# Patient Record
(demographics unavailable — no encounter records)

---

## 2024-12-19 NOTE — HISTORY OF PRESENT ILLNESS
[de-identified] : 12/19/2024 Mr. BRAULIO SIERRA, a 30 year old male, presents today for R ankle pain since 12/18/24. States he was playing volleyball last night and came down on an opposing athlete's ankle and sustained an inversion sprain. Reports he felt a pop, and edema settled in quickly. Using RICE, with some relief today and decreased edema. Denies PMH of injury to think ankle.

## 2024-12-19 NOTE — PHYSICAL EXAM
[Right] : right foot and ankle [Moderate] : moderate swelling of lateral ankle [4___] : eversion 4[unfilled]/5 [5th] : 5th [] : no metatarsal tenderness [de-identified] : Pain with weightbearing alone on the right foot [[id number]] : dorsiflexion 10 degrees [de-identified] : plantar flexion 20 degrees [de-identified] : inversion 5 degrees [de-identified] : eversion 5 degrees

## 2024-12-19 NOTE — ASSESSMENT
[FreeTextEntry1] : - Discussed the role of home range of motion and NSAIDs -Prescription for physical therapy provided -will get him lace up support for ambulation. Brace was appropriately fitted and dispensed. Pt left the office with brace as stated above.  - f/u 3 weeks

## 2025-01-09 NOTE — PHYSICAL EXAM
[Right] : right foot and ankle [5th] : 5th [Moderate] : moderate swelling of lateral ankle [5___] : eversion 5[unfilled]/5 [] : no difficulty with single heel rise

## 2025-01-09 NOTE — DISCUSSION/SUMMARY
[de-identified] : 30m with right ankle sprain. Improving with PT The patient was advised of the diagnosis. The natural history of the pathology was explained in full to the patient in layman's terms. All questions were answered. The risks and benefits of surgical and non-surgical treatment alternatives were explained in full to the patient.  1) complete PT and c/w HEP  2) cryotherapy, rest and activity modification  3) lace up ankle brace prn  4) rtc prn   Entered by Lida Gale acting as scribe. Dr. Cota- The documentation recorded by the scribe accurately reflects the service I personally performed and the decisions made by me.

## 2025-01-09 NOTE — HISTORY OF PRESENT ILLNESS
[0] : 0 [Full time] : Work status: full time [de-identified] : 1/9/25: Here to f/u R ankle. Wearing lace up while working. Attending PT 2x/wk and he is making good progress.  12/19/2024 Mr. BRAULIO SIERRA, a 30 year old male, presents today for R ankle pain since 12/18/24. States he was playing volleyball last night and came down on an opposing athlete's ankle and sustained an inversion sprain. Reports he felt a pop, and edema settled in quickly. Using RICE, with some relief today and decreased edema. Denies PMH of injury to think ankle.  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